# Patient Record
Sex: MALE | Race: WHITE
[De-identification: names, ages, dates, MRNs, and addresses within clinical notes are randomized per-mention and may not be internally consistent; named-entity substitution may affect disease eponyms.]

---

## 2018-05-29 ENCOUNTER — HOSPITAL ENCOUNTER (EMERGENCY)
Dept: HOSPITAL 62 - ER | Age: 38
Discharge: HOME | End: 2018-05-29
Payer: OTHER GOVERNMENT

## 2018-05-29 VITALS — SYSTOLIC BLOOD PRESSURE: 133 MMHG | DIASTOLIC BLOOD PRESSURE: 77 MMHG
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
## 2018-05-29 DIAGNOSIS — S51.012A: Primary | ICD-10-CM

## 2018-05-29 DIAGNOSIS — Z23: ICD-10-CM

## 2018-05-29 PROCEDURE — 90715 TDAP VACCINE 7 YRS/> IM: CPT

## 2018-05-29 PROCEDURE — 90471 IMMUNIZATION ADMIN: CPT

## 2018-05-29 PROCEDURE — 99283 EMERGENCY DEPT VISIT LOW MDM: CPT

## 2018-05-29 PROCEDURE — 12001 RPR S/N/AX/GEN/TRNK 2.5CM/<: CPT

## 2018-05-29 PROCEDURE — 73080 X-RAY EXAM OF ELBOW: CPT

## 2018-05-29 NOTE — RADIOLOGY REPORT (SQ)
EXAM DESCRIPTION:  ELBOW RIGHT OVER 2 VIEWS



COMPLETED DATE/TIME:  5/29/2018 11:18 am



REASON FOR STUDY:  rt elbow pain s/p inury with laceration



COMPARISON:  10/9/2016



NUMBER OF VIEWS:  Four views.



TECHNIQUE:  AP, lateral, and both oblique radiographic images acquired of the right elbow.



LIMITATIONS:  None.



FINDINGS:  MINERALIZATION: Normal.

BONES: No acute fracture or dislocation.  No worrisome bone lesions.

JOINT: No elbow joint effusion.

SOFT TISSUES: Olecranon soft tissue swelling with air bubbles from laceration.  No retained radiopaqu
e foreign body.  No olecranon bony injury.

OTHER: No other significant finding.



IMPRESSION:  Olecranon soft tissue swelling with air bubbles from laceration.  No retained radiopaque
 foreign body.  No bony olecranon injury



TECHNICAL DOCUMENTATION:  JOB ID:  [id number]

 2011 [employer]- All Rights Reserved



Reading location - IP/workstation name: [email or web address]

## 2018-05-29 NOTE — ER DOCUMENT REPORT
ED General





- General


Chief Complaint: Elbow Injury


Stated Complaint: ELBOW INJURY


Time Seen by Provider: 05/29/18 10:44


TRAVEL OUTSIDE OF THE [place] IN LAST 30 DAYS: No





- HPI


Notes: 





37-year-old male presents to the ED for complaints of a laceration to his left 

elbow he was playing with his son, accidentally hit his elbow on the slide 

while he is playing for his son.  Denies any head trauma change in level 

consciousness.  Denies any other injury.  Unsure if his tetanus is not up-to-

date.  Pain is 10 out of 10, throbbing, has not had any medication for this.  

Denies any numbness or tingling to bilateral upper extremities.  Denies any 

known injury.  No active bleeding.  Bleeding is controlled.  Denies being on 

blood thinners.  Denies fevers, chills, numbness or tingling in bilateral upper 

or lower extremities equally, muscle paralysis, weakness in bilateral upper or 

lower extremities equally or rash. 





- Related Data


Allergies/Adverse Reactions: 


 





No Known Allergies Allergy (Unverified 10/09/16 13:09)


 











Past Medical History





- General


Information source: Patient





- Social History


Smoking Status: Never Smoker


Chew tobacco use (# tins/day): No


Frequency of alcohol use: None


Drug Abuse: None


Family History: Reviewed & Not Pertinent


Patient has suicidal ideation: No


Patient has homicidal ideation: No


Renal/ Medical History: Denies: Hx Peritoneal Dialysis


Past Surgical History: Reports: Hx Orthopedic Surgery





- Immunizations


Hx Diphtheria, Pertussis, Tetanus Vaccination: Yes





Review of Systems





- Review of Systems


Constitutional: No symptoms reported


EENT: No symptoms reported


Cardiovascular: No symptoms reported


Respiratory: No symptoms reported


Gastrointestinal: No symptoms reported


Genitourinary: No symptoms reported


Male Genitourinary: No symptoms reported


Musculoskeletal: No symptoms reported


Skin: See HPI


Hematologic/Lymphatic: No symptoms reported


Neurological/Psychological: No symptoms reported





Physical Exam





- Vital signs


Vitals: 


 











Temp Pulse Resp BP Pulse Ox


 


 98.8 F   72   14   133/77 H  97 


 


 05/29/18 10:18  05/29/18 10:18  05/29/18 10:18  05/29/18 10:18  05/29/18 10:18














- Notes


Notes: 





PHYSICAL EXAMINATION:





GENERAL: Well-appearing, well-nourished and in no acute distress.





HEAD: Atraumatic, normocephalic.





EYES: Pupils equal round and reactive to light, extraocular movements intact, 

sclera anicteric, conjunctiva are normal.





ENT: Nares patent, oropharynx clear without exudates.  Moist mucous membranes.





NECK: Normal range of motion, supple without lymphadenopathy





LUNGS: Breath sounds clear to auscultation bilaterally and equal.  No wheezes 

rales or rhonchi.





HEART: Regular rate and rhythm without murmurs





ABDOMEN: Soft, nontender, nondistended abdomen.  No guarding, no rebound.  No 

masses appreciated.





Musculoskeletal: Normal range of motion, no pitting or edema.  No cyanosis.  2 

cm linear laceration left elbow. no pain  abduction, flexion, supination, 

pronation or extension.   + 2 BUE equally. APROM in shoulder. DTR +2 in 

BUE equally. Noted crepitus with APROM in elbow. negative drop arm, neer sign, 

horvath test.  Full motor and sensory function in JULIO. No  vascular compromise.

  No erythema or induration noted to area. Intact median, ulnar and radial 

nerves bilaterally and equally.





NEUROLOGICAL: Cranial nerves grossly intact.  Normal speech, normal gait.  

Normal sensory, motor exams 





PSYCH: Normal mood, normal affect.





SKIN: Warm, Dry, normal turgor, no rashes or lesions noted.





Course





- Re-evaluation


Re-evalutation: 





05/29/18 12:02


Healthy 37-year-old male who is afebrile, vitals stable and in no distress 

presents for laceration repair after injuring himself fall on the playground 

with his son approximately 2 hours ago.  Tetanus is not up-to-date, tetanus 

given today.  X-ray left elbow negative for any foreign body, fractures or 

dislocations.  No consent given to do laceration repair.  Patient tolerated 

procedure without incident.  Discussed with patient that he will need to have 

his taken out in 14-21 days due to the fact that it is directly on the elbow 

joint therefore there is a high recurrence of possible dehiscence due to stress 

and the sutures, to see if the patient will place him in a loose splint to 

release tension on the sutures.  Advised patient to be seen by his primary care 

doctor for wound reevaluation in 3-5 days.  Prophylactic antibiotics given to 

the patient outside while it was raining when he lacerated his left elbow 

playing with his son at a local playground.  After performing a Medical 

Screening Examination, I estimate there is LOW risk for OPEN FRACTURE, 

COMPARTMENT SYNDROME, TENDON RUPTURE, ACUTE NEUROVASCULAR INJURY, or RETAINED 

FOREIGN BODY, thus I consider the discharge disposition reasonable. Also, there 

is no evidence or peritonitis, sepsis, or toxicity.  I have reevaluated this 

patient multiple times and no significant life threatening changes are noted. 

The patient and I have discussed the diagnosis and risks, and we agree with 

discharging home with close follow-up with the understanding that symptoms and 

presentations can change. We also discussed returning to the Emergency 

Department immediately if new or worsening symptoms occur. We have discussed 

the symptoms which are most concerning (e.g., changing or worsening pain, fever

, numbness, weakness, cool or painful digits) that necessitate immediate 

return.Advised take over-the-counter ibuprofen and Tylenol as needed for pain.  

Monitor site for any signs and symptoms of infection such as erythema, purulent 

drainage, warmth to touch increasing.  Patient discharged home

















- Vital Signs


Vital signs: 


 











Temp Pulse Resp BP Pulse Ox


 


 98.8 F   72   14   133/77 H  97 


 


 05/29/18 10:18  05/29/18 10:18  05/29/18 10:18  05/29/18 10:18  05/29/18 10:18














Procedures





- Laceration/Wound Repair


  ** Left Elbow


Time completed: 11:51


Wound length (cm): 2 - cm


Wound's Depth, Shape: Superficial


Laceration pre-procedure: Betadine prep applied, Sterile drapes applied, Shur-

Clens applied, Other - She irrigation with 200 mL normal saline, no foreign 

body noted on examination of wound.


Anesthetic type: 1% Lidocaine


Volume Anesthetic (mLs): 4 - mL


Wound explored: Clean, No foreign body removed


Wound Debrided: Minimal


Wound Repaired With: Sutures


Suture Size/Type: 4:0, Ethilon


Number of Sutures: 6


Layer Closure?: No


Post-procedure wound care: Splint applied - consent by patient given to place 

45 degree left long arm splint to utilize sutures ,. cms intact, sensory motor 

function intact in bilateral upper extremities prior to splint application 

fiberglass splint placed without incident. cms intact 20 minutes after splint 

application. Splint is in good alignment. Bilateral upper extremities with 

motor and sensory function intact 20 minutes after application. Pt stated that 

splint felt comfortable.





Discharge





- Discharge


Clinical Impression: 


Laceration of elbow


Qualifiers:


 Encounter type: initial encounter Laterality: left Qualified Code(s): S51.012A 

- Laceration without foreign body of left elbow, initial encounter





Condition: Stable


Disposition: HOME, SELF-CARE


Instructions:  Tetanus Immunization Given ([facility]), Prophylactic Antibiotic ([facility]), 

Laceration Care ([facility]), Soap Cleansing ([facility])


Additional Instructions: 


               Laceration Care





     Your laceration has been sutured to keep the skin edges aligned during 

healing.  The time of suture removal depends on the nature and location of your 

cut.  Please follow the care instructions the doctor has outlined for you and 

return for further care, according to the schedule you've been given.


     Keep the wound and dressing clean.  Unless you were told otherwise, you 

may shower daily, blotting the wound dry with a clean, unused towel.  At other 

times, If the dressing gets wet or blood soaked, remove it and blot the wound 

dry, then reapply a new dressing.  Unless you were instructed otherwise, 

dressings should be changed at least daily.


     If any signs of infection occur (swelling, redness, increasing tenderness, 

red streaks, tender lumps in the armpit or groin above the laceration, or fever)

, see the doctor immediately.


                                              








Sutures need to be removed in 14-21 days.  Advised wound check in 3-5 days.  

Take antibiotics with food.  Wash with soap and water, and extremity tingling 

to legs until sutures are removed.  No heavy lifting. a splint has been applied 

to help relieve tension off the sutures.  keep dry for the first 24-48 hours.  

if you noticed any erythema, purulent drainage, warmth to touch, return to the 

ER for reevaluation. 




















 Return immediately for any new or worsening symptoms.





Follow up with primary care provider, call tomorrow to make followup 

appointment.


Prescriptions: 


Cephalexin Monohydrate [Keflex 500 mg Capsule] 500 mg PO BID #10 capsule


Forms:  Return to Work


Referrals: 


SASHA CHACKO MD [ACTIVE STAFF] - Follow up as needed

## 2018-05-29 NOTE — ER DOCUMENT REPORT
ED Medical Screen (RME)





- General


Chief Complaint: Elbow Injury


Stated Complaint: ELBOW INJURY


Time Seen by Provider: 05/29/18 10:44


TRAVEL OUTSIDE OF THE [place] IN LAST 30 DAYS: No





- HPI


Notes: 





05/29/18 10:47


Patient is a 37-year-old male no significant past medical history who presents 

to the ED complaining of right elbow pain and laceration status post fall prior 

to arrival.  Patient states that he was playing on a playground with his son 

when he slipped off and landed on his elbow.  Patient states that he could see 

the bone through the open wound.  He is not sure of his last tetanus.  No other 

concerns or complaints at this time.  Denies any chest pain, trouble breathing, 

numbness/tingling, paralysis/weakness.








I have treated and performed a rapid initial assessment of this patient.  A 

comprehensive ED assessment and evaluation of the patient, analysis of test 

results and completion of medical decision making process will be conducted by 

additional ED providers.








PHYSICAL EXAMINATION:





GENERAL: Well-appearing, well-nourished and in no acute distress.  A&Ox4.  

Answers questions appropriately.





LUNGS: Breath sounds clear to auscultation bilaterally and equal.  No wheezes 

rales or rhonchi.





HEART: Regular rate and rhythm without murmurs, rubs, gallops.





Extremities:  No cyanosis, clubbing, or edema b/l.  





NEUROLOGICAL: Normal speech, normal gait. 





PSYCH: Normal mood, normal affect.





MS:  rt elbow:  FROM.  Strength 5+/5.  N/V intact distal.





Skin: rt elbow:  brief exam showed a laceration that has the potential to be an 

'open fracture'  





- Related Data


Allergies/Adverse Reactions: 


 





No Known Allergies Allergy (Unverified 10/09/16 13:09)


 











Past Medical History





- Social History


Chew tobacco use (# tins/day): No


Frequency of alcohol use: None


Drug Abuse: None


Renal/ Medical History: Denies: Hx Peritoneal Dialysis


Past Surgical History: Reports: Hx Orthopedic Surgery





- Immunizations


Hx Diphtheria, Pertussis, Tetanus Vaccination: Yes





Physical Exam





- Vital signs


Vitals: 





 











Temp Pulse Resp BP Pulse Ox


 


 98.8 F   72   14   133/77 H  97 


 


 05/29/18 10:18  05/29/18 10:18  05/29/18 10:18  05/29/18 10:18  05/29/18 10:18














Course





- Vital Signs


Vital signs: 





 











Temp Pulse Resp BP Pulse Ox


 


 98.8 F   72   14   133/77 H  97 


 


 05/29/18 10:18  05/29/18 10:18  05/29/18 10:18  05/29/18 10:18  05/29/18 10:18